# Patient Record
Sex: MALE | Race: WHITE | Employment: FULL TIME | URBAN - METROPOLITAN AREA
[De-identification: names, ages, dates, MRNs, and addresses within clinical notes are randomized per-mention and may not be internally consistent; named-entity substitution may affect disease eponyms.]

---

## 2018-06-13 PROBLEM — M25.562 PATELLOFEMORAL JOINT PAIN, LEFT: Status: ACTIVE | Noted: 2018-06-13

## 2018-07-05 ENCOUNTER — OFFICE VISIT (OUTPATIENT)
Dept: FAMILY MEDICINE CLINIC | Facility: CLINIC | Age: 58
End: 2018-07-05

## 2018-07-05 VITALS
SYSTOLIC BLOOD PRESSURE: 130 MMHG | HEIGHT: 78 IN | WEIGHT: 284 LBS | TEMPERATURE: 99 F | HEART RATE: 86 BPM | BODY MASS INDEX: 32.86 KG/M2 | RESPIRATION RATE: 16 BRPM | DIASTOLIC BLOOD PRESSURE: 78 MMHG

## 2018-07-05 DIAGNOSIS — H69.83 DYSFUNCTION OF BOTH EUSTACHIAN TUBES: Primary | ICD-10-CM

## 2018-07-05 PROCEDURE — 99202 OFFICE O/P NEW SF 15 MIN: CPT | Performed by: NURSE PRACTITIONER

## 2018-07-05 RX ORDER — METHYLPREDNISOLONE 4 MG/1
TABLET ORAL
Qty: 1 KIT | Refills: 0 | Status: SHIPPED | OUTPATIENT
Start: 2018-07-05 | End: 2019-07-10 | Stop reason: ALTCHOICE

## 2018-07-05 RX ORDER — FLUTICASONE PROPIONATE 50 MCG
SPRAY, SUSPENSION (ML) NASAL
Qty: 1 BOTTLE | Refills: 0 | Status: SHIPPED | OUTPATIENT
Start: 2018-07-05 | End: 2018-10-02

## 2018-07-05 NOTE — PROGRESS NOTES
CHIEF COMPLAINT:   Patient presents with:  Earache: left ear pressure and itchy and dizziness sx x 1 week.        HPI:   Pola Fine is a 63year old male who presents to clinic today with complaints of constant bilat ear pressure/fullness/muffled hea Multiple Vitamins-Minerals (ZINC OR) Take by mouth daily. Disp:  Rfl:    Multiple Vitamins-Minerals (MULTIVITAMIN OR) Take  by mouth.  Disp:  Rfl:    CLARITIN 10 MG OR TABS 1 TABLET DAILY Disp: 30 Tab Rfl: 0   ASPIRIN 325 MG OR TABS one tablet daily Disp: GENERAL: well developed, well nourished, and in no apparent distress  SKIN: no rashes, no suspicious lesions  HEAD: atraumatic, normocephalic  EYES: conjunctiva clear, EOM intact, PERRLA. EARS: Tragus non tender on palpation bilaterally.  External auditory - Patient verbalizes understanding and is agreeable w/ plan.     Meds & Refills for this Visit:    Signed Prescriptions Disp Refills    Fluticasone Propionate 50 MCG/ACT Nasal Suspension 1 Bottle 0      Sig: Instill 2 sprays in each nostril once daily Because the middle ear fluid can become infected, it is important to watch for signs of an ear infection which may develop later. These signs include increased ear pain, fever, or drainage from the ear.   Home care  The following guidelines will help you ca

## 2021-04-12 DIAGNOSIS — Z23 NEED FOR VACCINATION: ICD-10-CM

## 2021-09-29 PROBLEM — D33.3 VESTIBULAR SCHWANNOMA (HCC): Status: ACTIVE | Noted: 2021-09-29
